# Patient Record
Sex: MALE | Race: BLACK OR AFRICAN AMERICAN | NOT HISPANIC OR LATINO | Employment: STUDENT | URBAN - METROPOLITAN AREA
[De-identification: names, ages, dates, MRNs, and addresses within clinical notes are randomized per-mention and may not be internally consistent; named-entity substitution may affect disease eponyms.]

---
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
## 2019-03-27 ENCOUNTER — OUTSIDE PLACE OF SERVICE (OUTPATIENT)
Dept: CARDIOLOGY | Facility: CLINIC | Age: 19
End: 2019-03-27
Payer: COMMERCIAL

## 2019-03-27 PROCEDURE — 93010 PR ELECTROCARDIOGRAM REPORT: ICD-10-PCS | Mod: S$GLB,,, | Performed by: STUDENT IN AN ORGANIZED HEALTH CARE EDUCATION/TRAINING PROGRAM

## 2019-03-27 PROCEDURE — 93010 ELECTROCARDIOGRAM REPORT: CPT | Mod: S$GLB,,, | Performed by: STUDENT IN AN ORGANIZED HEALTH CARE EDUCATION/TRAINING PROGRAM

## 2019-07-24 ENCOUNTER — LAB VISIT (OUTPATIENT)
Dept: LAB | Facility: HOSPITAL | Age: 19
End: 2019-07-24
Attending: FAMILY MEDICINE
Payer: COMMERCIAL

## 2019-07-24 ENCOUNTER — OFFICE VISIT (OUTPATIENT)
Dept: FAMILY MEDICINE | Facility: CLINIC | Age: 19
End: 2019-07-24
Payer: COMMERCIAL

## 2019-07-24 VITALS
HEART RATE: 57 BPM | BODY MASS INDEX: 26.45 KG/M2 | OXYGEN SATURATION: 99 % | HEIGHT: 69 IN | DIASTOLIC BLOOD PRESSURE: 70 MMHG | WEIGHT: 178.56 LBS | SYSTOLIC BLOOD PRESSURE: 138 MMHG | TEMPERATURE: 98 F

## 2019-07-24 DIAGNOSIS — Z00.00 ANNUAL PHYSICAL EXAM: ICD-10-CM

## 2019-07-24 DIAGNOSIS — Z00.00 ANNUAL PHYSICAL EXAM: Primary | ICD-10-CM

## 2019-07-24 DIAGNOSIS — F98.8 ATTENTION DEFICIT DISORDER, UNSPECIFIED HYPERACTIVITY PRESENCE: ICD-10-CM

## 2019-07-24 LAB
25(OH)D3+25(OH)D2 SERPL-MCNC: 26 NG/ML (ref 30–96)
ALBUMIN SERPL BCP-MCNC: 4 G/DL (ref 3.5–5.2)
ALP SERPL-CCNC: 61 U/L (ref 55–135)
ALT SERPL W/O P-5'-P-CCNC: 8 U/L (ref 10–44)
ANION GAP SERPL CALC-SCNC: 9 MMOL/L (ref 8–16)
AST SERPL-CCNC: 14 U/L (ref 10–40)
BASOPHILS # BLD AUTO: 0.05 K/UL (ref 0–0.2)
BASOPHILS NFR BLD: 1.3 % (ref 0–1.9)
BILIRUB SERPL-MCNC: 0.3 MG/DL (ref 0.1–1)
BUN SERPL-MCNC: 11 MG/DL (ref 6–20)
CALCIUM SERPL-MCNC: 10.4 MG/DL (ref 8.7–10.5)
CHLORIDE SERPL-SCNC: 106 MMOL/L (ref 95–110)
CO2 SERPL-SCNC: 28 MMOL/L (ref 23–29)
CREAT SERPL-MCNC: 1.2 MG/DL (ref 0.5–1.4)
DIFFERENTIAL METHOD: ABNORMAL
EOSINOPHIL # BLD AUTO: 0.1 K/UL (ref 0–0.5)
EOSINOPHIL NFR BLD: 1.8 % (ref 0–8)
ERYTHROCYTE [DISTWIDTH] IN BLOOD BY AUTOMATED COUNT: 12.3 % (ref 11.5–14.5)
EST. GFR  (AFRICAN AMERICAN): >60 ML/MIN/1.73 M^2
EST. GFR  (NON AFRICAN AMERICAN): >60 ML/MIN/1.73 M^2
ESTIMATED AVG GLUCOSE: 111 MG/DL (ref 68–131)
GLUCOSE SERPL-MCNC: 59 MG/DL (ref 70–110)
HBA1C MFR BLD HPLC: 5.5 % (ref 4–5.6)
HCT VFR BLD AUTO: 42.6 % (ref 40–54)
HGB BLD-MCNC: 14 G/DL (ref 14–18)
IMM GRANULOCYTES # BLD AUTO: 0.01 K/UL (ref 0–0.04)
IMM GRANULOCYTES NFR BLD AUTO: 0.3 % (ref 0–0.5)
LYMPHOCYTES # BLD AUTO: 1.4 K/UL (ref 1–4.8)
LYMPHOCYTES NFR BLD: 36.8 % (ref 18–48)
MCH RBC QN AUTO: 28.9 PG (ref 27–31)
MCHC RBC AUTO-ENTMCNC: 32.9 G/DL (ref 32–36)
MCV RBC AUTO: 88 FL (ref 82–98)
MONOCYTES # BLD AUTO: 0.5 K/UL (ref 0.3–1)
MONOCYTES NFR BLD: 13.6 % (ref 4–15)
NEUTROPHILS # BLD AUTO: 1.8 K/UL (ref 1.8–7.7)
NEUTROPHILS NFR BLD: 46.2 % (ref 38–73)
NRBC BLD-RTO: 0 /100 WBC
PLATELET # BLD AUTO: 322 K/UL (ref 150–350)
PMV BLD AUTO: 9.3 FL (ref 9.2–12.9)
POTASSIUM SERPL-SCNC: 4.4 MMOL/L (ref 3.5–5.1)
PROT SERPL-MCNC: 7.2 G/DL (ref 6–8.4)
RBC # BLD AUTO: 4.84 M/UL (ref 4.6–6.2)
SODIUM SERPL-SCNC: 143 MMOL/L (ref 136–145)
TSH SERPL DL<=0.005 MIU/L-ACNC: 0.69 UIU/ML (ref 0.4–4)
VIT B12 SERPL-MCNC: 361 PG/ML (ref 210–950)
WBC # BLD AUTO: 3.83 K/UL (ref 3.9–12.7)

## 2019-07-24 PROCEDURE — 99999 PR PBB SHADOW E&M-EST. PATIENT-LVL III: ICD-10-PCS | Mod: PBBFAC,,, | Performed by: FAMILY MEDICINE

## 2019-07-24 PROCEDURE — 99999 PR PBB SHADOW E&M-EST. PATIENT-LVL III: CPT | Mod: PBBFAC,,, | Performed by: FAMILY MEDICINE

## 2019-07-24 PROCEDURE — 83036 HEMOGLOBIN GLYCOSYLATED A1C: CPT

## 2019-07-24 PROCEDURE — 84443 ASSAY THYROID STIM HORMONE: CPT

## 2019-07-24 PROCEDURE — 80053 COMPREHEN METABOLIC PANEL: CPT

## 2019-07-24 PROCEDURE — 36415 COLL VENOUS BLD VENIPUNCTURE: CPT | Mod: PO

## 2019-07-24 PROCEDURE — 82607 VITAMIN B-12: CPT

## 2019-07-24 PROCEDURE — 82306 VITAMIN D 25 HYDROXY: CPT

## 2019-07-24 PROCEDURE — 99395 PREV VISIT EST AGE 18-39: CPT | Mod: S$GLB,,, | Performed by: FAMILY MEDICINE

## 2019-07-24 PROCEDURE — 99395 PR PREVENTIVE VISIT,EST,18-39: ICD-10-PCS | Mod: S$GLB,,, | Performed by: FAMILY MEDICINE

## 2019-07-24 PROCEDURE — 85025 COMPLETE CBC W/AUTO DIFF WBC: CPT

## 2019-07-24 RX ORDER — DEXTROAMPHETAMINE SACCHARATE, AMPHETAMINE ASPARTATE MONOHYDRATE, DEXTROAMPHETAMINE SULFATE AND AMPHETAMINE SULFATE 7.5; 7.5; 7.5; 7.5 MG/1; MG/1; MG/1; MG/1
30 CAPSULE, EXTENDED RELEASE ORAL EVERY MORNING
Qty: 30 CAPSULE | Refills: 0 | Status: SHIPPED | OUTPATIENT
Start: 2019-07-24 | End: 2019-07-24 | Stop reason: SDUPTHER

## 2019-07-24 RX ORDER — DEXTROAMPHETAMINE SACCHARATE, AMPHETAMINE ASPARTATE MONOHYDRATE, DEXTROAMPHETAMINE SULFATE AND AMPHETAMINE SULFATE 7.5; 7.5; 7.5; 7.5 MG/1; MG/1; MG/1; MG/1
30 CAPSULE, EXTENDED RELEASE ORAL EVERY MORNING
Qty: 30 CAPSULE | Refills: 0 | Status: SHIPPED | OUTPATIENT
Start: 2019-09-22 | End: 2019-08-27

## 2019-07-24 RX ORDER — DEXTROAMPHETAMINE SACCHARATE, AMPHETAMINE ASPARTATE MONOHYDRATE, DEXTROAMPHETAMINE SULFATE AND AMPHETAMINE SULFATE 5; 5; 5; 5 MG/1; MG/1; MG/1; MG/1
20 CAPSULE, EXTENDED RELEASE ORAL EVERY MORNING
Qty: 30 CAPSULE | Refills: 0 | Status: SHIPPED | OUTPATIENT
Start: 2019-08-23 | End: 2019-08-27

## 2019-07-24 RX ORDER — DEXTROAMPHETAMINE SACCHARATE, AMPHETAMINE ASPARTATE MONOHYDRATE, DEXTROAMPHETAMINE SULFATE AND AMPHETAMINE SULFATE 3.75; 3.75; 3.75; 3.75 MG/1; MG/1; MG/1; MG/1
15 CAPSULE, EXTENDED RELEASE ORAL EVERY MORNING
Qty: 30 CAPSULE | Refills: 0 | Status: SHIPPED | OUTPATIENT
Start: 2019-07-24 | End: 2019-07-24 | Stop reason: SDUPTHER

## 2019-07-24 RX ORDER — DEXTROAMPHETAMINE SACCHARATE, AMPHETAMINE ASPARTATE MONOHYDRATE, DEXTROAMPHETAMINE SULFATE AND AMPHETAMINE SULFATE 5; 5; 5; 5 MG/1; MG/1; MG/1; MG/1
20 CAPSULE, EXTENDED RELEASE ORAL EVERY MORNING
Qty: 30 CAPSULE | Refills: 0 | Status: SHIPPED | OUTPATIENT
Start: 2019-07-24 | End: 2019-07-24 | Stop reason: SDUPTHER

## 2019-07-24 RX ORDER — DEXTROAMPHETAMINE SACCHARATE, AMPHETAMINE ASPARTATE MONOHYDRATE, DEXTROAMPHETAMINE SULFATE AND AMPHETAMINE SULFATE 3.75; 3.75; 3.75; 3.75 MG/1; MG/1; MG/1; MG/1
15 CAPSULE, EXTENDED RELEASE ORAL EVERY MORNING
Qty: 30 CAPSULE | Refills: 0 | Status: SHIPPED | OUTPATIENT
Start: 2019-07-24 | End: 2019-08-27 | Stop reason: SDUPTHER

## 2019-07-24 NOTE — PROGRESS NOTES
Assessment & Plan  Problem List Items Addressed This Visit        Psychiatric    ADD (attention deficit disorder)    Overview     Emery forms 10/12         Current Assessment & Plan     Decreased appetite with vyvanse and symptoms of depression.           Relevant Medications    dextroamphetamine-amphetamine (ADDERALL XR) 15 MG 24 hr capsule    dextroamphetamine-amphetamine (ADDERALL XR) 20 MG 24 hr capsule (Start on 8/23/2019)    dextroamphetamine-amphetamine (ADDERALL XR) 30 MG 24 hr capsule (Start on 9/22/2019)      Other Visit Diagnoses     Annual physical exam    -  Primary    Relevant Orders    CBC auto differential (Completed)    Comprehensive metabolic panel    Hemoglobin A1c    TSH    Vitamin D    Vitamin B12        We discussed sleep hygiene as well as appropriate diet.  Discussed the importance of eating whole foods rather than processed foods.    Health Maintenance reviewed    Follow-up: No follow-ups on file.    ______________________________________________________________________    Chief Complaint  Chief Complaint   Patient presents with    ADHD    discuss medication       HPI  Roland Rosario is a 19 y.o. male with multiple medical diagnoses as listed in the medical history and problem list that presents for ADHD.  Pt is known to me with last appointment 8/14/2018.    No other medication used to treat ADHD aside from a short term use of Concerta.  Patient reports that it 70 mg of Vyvanse he did have some issues with depression as well as a severely decreased appetite.  Patient does not follow a healthy as diet.  He does have unusual sleeping hours.      PAST MEDICAL HISTORY:  Past Medical History:   Diagnosis Date    ADHD (attention deficit hyperactivity disorder)        PAST SURGICAL HISTORY:  Past Surgical History:   Procedure Laterality Date    testicular torsion      rt. testicle removed    TYMPANOSTOMY TUBE PLACEMENT         SOCIAL HISTORY:  Social History     Socioeconomic History     Marital status: Single     Spouse name: Not on file    Number of children: Not on file    Years of education: Not on file    Highest education level: Not on file   Occupational History    Not on file   Social Needs    Financial resource strain: Not on file    Food insecurity:     Worry: Not on file     Inability: Not on file    Transportation needs:     Medical: Not on file     Non-medical: Not on file   Tobacco Use    Smoking status: Never Smoker    Smokeless tobacco: Never Used   Substance and Sexual Activity    Alcohol use: No    Drug use: No    Sexual activity: Never   Lifestyle    Physical activity:     Days per week: Not on file     Minutes per session: Not on file    Stress: Not on file   Relationships    Social connections:     Talks on phone: Not on file     Gets together: Not on file     Attends Yazidi service: Not on file     Active member of club or organization: Not on file     Attends meetings of clubs or organizations: Not on file     Relationship status: Not on file   Other Topics Concern    Not on file   Social History Narrative    Pt lives with mother, father, sister.        Pt attends [employer] 12th grade.           FAMILY HISTORY:  Family History   Problem Relation Age of Onset    Hypertension Mother     Hypertension Father     Heart disease Maternal Grandmother     Hyperlipidemia Maternal Grandmother     Hypertension Maternal Grandmother     Cancer Maternal Grandfather         prsotate    Asthma Neg Hx     Birth defects Neg Hx     Chromosomal disorder Neg Hx     Diabetes Neg Hx     Early death Neg Hx     Mental illness Neg Hx     Seizures Neg Hx     Thyroid disease Neg Hx     ADD / ADHD Neg Hx     Other Neg Hx         no arrhythmias or sudden death       ALLERGIES AND MEDICATIONS: updated and reviewed.  Review of patient's allergies indicates:  No Known Allergies  Current Outpatient Medications   Medication Sig Dispense Refill     "dextroamphetamine-amphetamine (ADDERALL XR) 15 MG 24 hr capsule Take 1 capsule (15 mg total) by mouth every morning. 30 capsule 0    [START ON 8/23/2019] dextroamphetamine-amphetamine (ADDERALL XR) 20 MG 24 hr capsule Take 1 capsule (20 mg total) by mouth every morning. 30 capsule 0    [START ON 9/22/2019] dextroamphetamine-amphetamine (ADDERALL XR) 30 MG 24 hr capsule Take 1 capsule (30 mg total) by mouth every morning. 30 capsule 0     No current facility-administered medications for this visit.          ROS  Review of Systems   Constitutional: Negative for activity change, appetite change, fatigue, fever and unexpected weight change.   HENT: Negative for congestion and facial swelling.    Eyes: Negative for visual disturbance.   Respiratory: Negative for chest tightness, shortness of breath, wheezing and stridor.    Cardiovascular: Negative for chest pain, palpitations and leg swelling.   Gastrointestinal: Negative for abdominal distention, abdominal pain, constipation, diarrhea, nausea and vomiting.   Endocrine: Negative for cold intolerance, heat intolerance, polydipsia and polyuria.   Skin: Negative.    Allergic/Immunologic: Negative.    Neurological: Negative for dizziness, light-headedness, numbness and headaches.   Psychiatric/Behavioral: Negative for agitation and decreased concentration.           Physical Exam  Vitals:    07/24/19 1121   BP: 138/70   BP Location: Left arm   Patient Position: Sitting   BP Method: Large (Manual)   Pulse: (!) 57   Temp: 97.8 °F (36.6 °C)   TempSrc: Oral   SpO2: 99%   Weight: 81 kg (178 lb 9.2 oz)   Height: 5' 9" (1.753 m)    Body mass index is 26.37 kg/m².  Weight: 81 kg (178 lb 9.2 oz)   Height: 5' 9" (175.3 cm)   Physical Exam   Constitutional: He is oriented to person, place, and time. He appears well-developed and well-nourished.   HENT:   Head: Normocephalic and atraumatic.   Right Ear: External ear normal.   Left Ear: External ear normal.   Nose: Nose normal. "   Mouth/Throat: Oropharynx is clear and moist. No oropharyngeal exudate.   Eyes: Pupils are equal, round, and reactive to light. Conjunctivae and EOM are normal.   Neck: Normal range of motion. Neck supple.   Cardiovascular: Normal rate, regular rhythm, normal heart sounds and intact distal pulses. Exam reveals no gallop and no friction rub.   No murmur heard.  Pulmonary/Chest: Effort normal and breath sounds normal.   Abdominal: Soft. Bowel sounds are normal.   Musculoskeletal: Normal range of motion.   Neurological: He is alert and oriented to person, place, and time. He has normal reflexes.   Skin: Skin is warm and dry.   Psychiatric: He has a normal mood and affect. His behavior is normal. Thought content normal.   Vitals reviewed.        Health Maintenance       Date Due Completion Date    HPV Vaccines (3 - Male 3-dose series) 10/23/2017 7/31/2017    Influenza Vaccine 08/01/2019 11/7/2015    TETANUS VACCINE 06/09/2020 6/9/2010              Patient note was created using [employer].  Any errors in syntax or even information may not have been identified and edited on initial review prior to signing this note.

## 2019-07-29 ENCOUNTER — OFFICE VISIT (OUTPATIENT)
Dept: PSYCHIATRY | Facility: CLINIC | Age: 19
End: 2019-07-29
Payer: COMMERCIAL

## 2019-07-29 DIAGNOSIS — F98.8 ADD (ATTENTION DEFICIT DISORDER) WITHOUT HYPERACTIVITY: Primary | ICD-10-CM

## 2019-07-29 PROCEDURE — 99999 PR PBB SHADOW E&M-EST. PATIENT-LVL II: CPT | Mod: PBBFAC,,, | Performed by: SOCIAL WORKER

## 2019-07-29 PROCEDURE — 99999 PR PBB SHADOW E&M-EST. PATIENT-LVL II: ICD-10-PCS | Mod: PBBFAC,,, | Performed by: SOCIAL WORKER

## 2019-07-29 PROCEDURE — 90791 PR PSYCHIATRIC DIAGNOSTIC EVALUATION: ICD-10-PCS | Mod: S$GLB,,, | Performed by: SOCIAL WORKER

## 2019-07-29 PROCEDURE — 90791 PSYCH DIAGNOSTIC EVALUATION: CPT | Mod: S$GLB,,, | Performed by: SOCIAL WORKER

## 2019-07-29 NOTE — PROGRESS NOTES
Psychiatry Initial Visit (PhD/LCSW)  Diagnostic Interview - CPT 91135    Date: 7/29/2019    Site: [facility]    Referral source: family    Clinical status of patient: Outpatient    Roland Rosario, a 19 y.o. male, for initial evaluation visit.  Met with patient and mother.    Chief complaint/reason for encounter: attention deficit, sleep and behavior    History of present illness: here with mother for symptoms of ADD more than ADHD and when younger was ADHD, and was on vyvance for many years from Dr. Marla Sanchez and found when he was in college at [employer] right after high school he has side effects from the vyvance and stopped using it, after one year at [employer], did not do well at [employer] and did not do well the first semester, and was not doing well after that so left and went to his home [employer] college, he did not do well either, and wants now to study computers and IT for companies and plants. Laurie to the [employer] one semester. In 2018 fall. He worked at [employer] for several months and was let fo due to hard time focsing, he thinks he still has the ADD part of ADHD but not hyper.    Pain: 0    Symptoms:   · Mood: denied  · Anxiety: denied  · Substance abuse: denied  · Cognitive functioning: denied  · Health behaviors: noncontributory    Psychiatric history: none    Medical history: history of ADHD and medications, for ADHD mostly vyvance and had to change doctors because he outgrew his pediatrician had to see Dr. Mariel Jaquez MD recent for working with PCP adults. On the [employer]. Here for help with ADHD and or ADD and why he can not be successful, does not like school.    Family history of psychiatric illness: none    Social history (marriage, employment, etc.): he feels more prepared ad more mature for school and adult life now so will start community college again, lives with mother who is a teacher age 55, and father age 54 works in a plant and sister lives out of the home and age 26 she  has a child and works on her masters degree. Family are responsible and healthy and want help for him.    Substance use:   Alcohol: none   Drugs: none   Tobacco: none   Caffeine: some    Current medications and drug reactions (include OTC, herbal): see medication list none right now and the MD on the [facility] [place] a prescription for adderall of 15mg and then 20 mg. Than have not filled yet. Has been on concerta and vyvance in the past.    Strengths and liabilities: Strength: Patient accepts guidance/feedback, Strength: Patient is expressive/articulate., Strength: Patient is intelligent., Strength: Patient is motivated for change., Strength: Patient is physically healthy., Strength: Patient has positive support network., Strength: Patient has reasonable judgment., Strength: Patient is stable., Liability: Patient is defensive., Liability: Patient lacks coping skills.    Current Evaluation:     Mental Status Exam:  General Appearance:  unremarkable, age appropriate   Speech: normal tone, normal rate, normal pitch, normal volume      Level of Cooperation: cooperative      Thought Processes: normal and logical   Mood: steady, ADD information      Thought Content: normal, no suicidality, no homicidality, delusions, or paranoia   Affect: congruent and appropriate   Orientation: Oriented x3   Memory: recent >  intact, remote >  intact   Attention Span & Concentration: some issues are occurring   Fund of General Knowledge: intact and appropriate to age and level of education   Abstract Reasoning: interpretation of similarities was concrete   Judgment & Insight: good     Language  intact     Diagnostic Impression - Plan:       ICD-10-CM ICD-9-CM   1. ADD (attention deficit disorder) without hyperactivity F98.8 314.00       Plan:individual psychotherapy, consult psychiatrist for medication evaluation, medication management by physician and basic ADHD assessment    Return to Clinic: 2 weeks    Length of Service (minutes): 45    Will see him for counseling, and also see MD here  For psychiatric evaluation, and also have him re-evaluated for ADD or other issues. Read chidi, math is an issues and does not in the past like school, says he is more mature and responsible now and will do better.

## 2019-08-01 ENCOUNTER — OFFICE VISIT (OUTPATIENT)
Dept: PSYCHIATRY | Facility: CLINIC | Age: 19
End: 2019-08-01
Payer: COMMERCIAL

## 2019-08-01 VITALS
HEART RATE: 82 BPM | WEIGHT: 182.19 LBS | DIASTOLIC BLOOD PRESSURE: 79 MMHG | SYSTOLIC BLOOD PRESSURE: 138 MMHG | BODY MASS INDEX: 26.91 KG/M2

## 2019-08-01 DIAGNOSIS — F90.0 ADHD, PREDOMINANTLY INATTENTIVE TYPE: Primary | ICD-10-CM

## 2019-08-01 LAB
AMP D-AMPHETAMINE 1000 NG/ML: NEGATIVE
BAR SECOBARBITAL 300 NG/ML: NEGATIVE
BUP BUPRENORPHINE 10 NG/ML: NEGATIVE
BZO OXAZEPAM 300 NG/ML: NEGATIVE
COC BENZOYLECGONINE 300 NG/ML: NEGATIVE
MAMP D-METHAMPHETAMINE 1000 NG/ML: NEGATIVE
MOP MORPHINE 300 NG/ML: NEGATIVE
MTD METHADONE 300 NG/ML: NEGATIVE
QXY OXYCODONE 100 NG/ML: NEGATIVE
THC 11-NOR-9-TETRAHYDROCANNABINOL-9-CARBOXYLIC ACID: NEGATIVE

## 2019-08-01 PROCEDURE — 99999 PR PBB SHADOW E&M-EST. PATIENT-LVL II: ICD-10-PCS | Mod: PBBFAC,,, | Performed by: STUDENT IN AN ORGANIZED HEALTH CARE EDUCATION/TRAINING PROGRAM

## 2019-08-01 PROCEDURE — 99212 OFFICE O/P EST SF 10 MIN: CPT | Mod: S$GLB,,, | Performed by: STUDENT IN AN ORGANIZED HEALTH CARE EDUCATION/TRAINING PROGRAM

## 2019-08-01 PROCEDURE — 99212 PR OFFICE/OUTPT VISIT, EST, LEVL II, 10-19 MIN: ICD-10-PCS | Mod: S$GLB,,, | Performed by: STUDENT IN AN ORGANIZED HEALTH CARE EDUCATION/TRAINING PROGRAM

## 2019-08-01 PROCEDURE — 99999 PR PBB SHADOW E&M-EST. PATIENT-LVL II: CPT | Mod: PBBFAC,,, | Performed by: STUDENT IN AN ORGANIZED HEALTH CARE EDUCATION/TRAINING PROGRAM

## 2019-08-01 PROCEDURE — 80305 DRUG TEST PRSMV DIR OPT OBS: CPT | Mod: QW,S$GLB,, | Performed by: STUDENT IN AN ORGANIZED HEALTH CARE EDUCATION/TRAINING PROGRAM

## 2019-08-01 PROCEDURE — 80305 POCT URINE DRUG SCREEN PAIN MANAGEMENT: ICD-10-PCS | Mod: QW,S$GLB,, | Performed by: STUDENT IN AN ORGANIZED HEALTH CARE EDUCATION/TRAINING PROGRAM

## 2019-08-01 NOTE — PATIENT INSTRUCTIONS
Amphetamine; Dextroamphetamine extended-release capsules  What is this medicine?  AMPHETAMINE; DEXTROAMPHETAMINE (am FET a meen; dex troe am FET a meen) is used to treat attention-deficit hyperactivity disorder (ADHD). Federal law prohibits giving this medicine to any person other than the person for whom it was prescribed. Do not share this medicine with anyone else.  How should I use this medicine?  Take this medicine by mouth with a glass of water. Follow the directions on the prescription label. This medicine is taken just one time per day, usually in the morning after waking up. Take with or without food. Do not chew or crush this medicine. You may open the capsules and sprinkle the medicine on a spoonful of applesauce. If sprinkled on applesauce, take the dose immediately and do not crush or chew. Always drink a glass of water or other liquid after taking this medicine. Do not take your medicine more often than directed.  A special MedGuide will be given to you by the pharmacist with each prescription and refill. Be sure to read this information carefully each time.  Talk to your pediatrician regarding the use of this medicine in children. While this drug may be prescribed for children as young as 6 years for selected conditions, precautions do apply.  What side effects may I notice from receiving this medicine?  Side effects that you should report to your doctor or health care professional as soon as possible:  · allergic reactions like skin rash, itching or hives, swelling of the face, lips, or tongue  · changes in vision  · chest pain or chest tightness  · confusion, trouble speaking or understanding  · fast, irregular heartbeat  · fingers or toes feel numb, cool, painful  · hallucination, loss of contact with reality  · high blood pressure  · males: prolonged or painful erection  · seizures  · severe headaches  · shortness of breath  · suicidal thoughts or other mood changes  · trouble walking,  dizziness, loss of balance or coordination  · uncontrollable head, mouth, neck, arm, or leg movements  Side effects that usually do not require medical attention (report to your doctor or health care professional if they continue or are bothersome):  · anxious  · headache  · loss of appetite  · nausea, vomiting  · trouble sleeping  · weight loss  What may interact with this medicine?  Do not take this medicine with any of the following medications:  · MAOIs like Carbex, Eldepryl, Marplan, Nardil, and Parnate  · other stimulant medicines for attention disorders, weight loss, or to stay awake  This medicine may also interact with the following medications:  · acetazolamide  · ammonium chloride  · antacids  · ascorbic acid  · atomoxetine  · caffeine  · certain medicines for blood pressure  · certain medicines for depression, anxiety, or psychotic disturbances  · certain medicines for diabetes  · certain medicines for seizures like carbamazepine, phenobarbital, phenytoin  · certain medicines for stomach problems like cimetidine, famotidine, omeprazole, lansoprazole  · cold or allergy medicines  · glutamic acid  · lithium  · meperidine  · methenamine; sodium acid phosphate  · narcotic medicines for pain  · norepinephrine  · phenothiazines like chlorpromazine, mesoridazine, prochlorperazine, thioridazine  · sodium bicarbonate  What if I miss a dose?  If you miss a dose, take it as soon as you can. If it is almost time for your next dose, take only that dose. Do not take double or extra doses.  Where should I keep my medicine?  Keep out of the reach of children. This medicine can be abused. Keep your medicine in a safe place to protect it from theft. Do not share this medicine with anyone. Selling or giving away this medicine is dangerous and against the law.  Store at room temperature between 15 and 30 degrees C (59 and 86 degrees F). Keep container tightly closed. Protect from light. Throw away any unused medicine after  the expiration date.  What should I tell my health care provider before I take this medicine?  They need to know if you have any of these conditions:  · anxiety or panic attacks  · circulation problems in fingers and toes  · glaucoma  · hardening or blockages of the arteries or heart blood vessels  · heart disease or a heart defect  · high blood pressure  · history of a drug or alcohol abuse problem  · history of stroke  · kidney disease  · liver disease  · mental illness  · seizures  · suicidal thoughts, plans, or attempt; a previous suicide attempt by you or a family member  · thyroid disease  · Tourette's syndrome  · an unusual or allergic reaction to dextroamphetamine, other amphetamines, other medicines, foods, dyes, or preservatives  · pregnant or trying to get pregnant  · breast-feeding  What should I watch for while using this medicine?  Visit your doctor or health care professional for regular checks on your progress. This prescription requires that you follow special procedures with your doctor and pharmacy. You will need to have a new written prescription from your doctor every time you need a refill.  This medicine may affect your concentration, or hide signs of tiredness. Until you know how this medicine affects you, do not drive, ride a bicycle, use machinery, or do anything that needs mental alertness.  Tell your doctor or health care professional if this medicine loses its effects, or if you feel you need to take more than the prescribed amount. Do not change the dosage without talking to your doctor or health care professional.  Decreased appetite is a common side effect when starting this medicine. Eating small, frequent meals or snacks can help. Talk to your doctor if you continue to have poor eating habits. Height and weight growth of a child taking this medicine will be monitored closely.  Do not take this medicine close to bedtime. It may prevent you from sleeping.  If you are going to need  surgery, an MRI, a CT scan, or other procedure, tell your doctor that you are taking this medicine. You may need to stop taking this medicine before the procedure.  Tell your doctor or healthcare professional right away if you notice unexplained wounds on your fingers and toes while taking this medicine. You should also tell your healthcare provider if you experience numbness or pain, changes in the skin color, or sensitivity to temperature in your fingers or toes.  NOTE:This sheet is a summary. It may not cover all possible information. If you have questions about this medicine, talk to your doctor, pharmacist, or health care provider. Copyright© 2017 [employer]

## 2019-08-01 NOTE — PROGRESS NOTES
"2019   Roland Rosario  : 2000  MRN: [id number]    Psychiatry Clinic Initial Evaluation  ?  Assessment:    Roland Rosario is a 19 y.o. male with a past history significant for ADHD who presented to clinic on referral from Psychology for assessment of ADHD symptoms and medication management     Plan:    -Patient with Adderall XR 15mg daily perscription from primary care  -Patient's mother asked multiple questions regarding supplements and told omega acids are okay  -Will continue with 15mg dose for 2 weeks to assess efficacy; can self titrate at home to 30mg daily if not noting improvement in attention  -Patient enrolling in [employer] on the  will return to clinic following to assess functioning in class  -Discussed drug screening and patient is amenable     RTC in 4 weeks    The potential risks, benefits, alternatives, and inherent unpredictabilities of management were discussed with the patient.  Policies of confidentiality, late policy/therapeutic hour, refill policy, clinic contact, and ED presentation criteria were discussed with the patient.  All voiced questions were answered.    Case discussed with staff psychiatrist, Tasia Estrada MD      Subjective:    HPI:    Roland Rosario is a 19 y.o. male with a history of ADHD who presented to clinic on 2019 on referral from psychology for management of ADHD symptoms.     -Patient previously on Concerta in 8th grade per collateral from mother with good response  -Reports the patient has failed three semesters while at [employer] and also lost track scholarship due to poor performance  -Pt felt discouraged in english and math after failing   -Vyvanse 11th grade through college with adverse effect of decreased appetite and diminished mood, titrated to 70mg     When initially diagnosed pt was noted by teachers and mother: "Couldn't sit still, out of seat multiple times, always interrupting, difficulty focusing"    Presently the patient endorses " distractability, difficulty organizing tasks, poor time management, forgetfulness.     Starting community college on the 21st     Denies illicit drug use, depressed mood, excessive worry, decrease need for sleep, icreased goal directed behavior, elevated mood, paranoia, AH, VH, SI, HI    Review of systems:  Constitutional: denies fatigue, weight change  Eyes: denies changes in vision, pain in eyes  Ears: denies changes in hearing, pain in ears  Mouth/throat: denies changes in voice, difficulty swallowing  Respiratory: denies shortness of breath, cough  Gastrointestinal: denies abdominal pain, nausea, constipation, diarrhea  Genitourinary: denies dysuria, changes in volume/frequency  Dermatologic: denies rash  Musculoskeletal: denies myalgias, arthralgias  Hematologic: denies easy bleeding/bruising  Neurologic: denies seizures, headaches  Psychiatric: see HPI      History:    Medical/Surgical History:  Past Medical History:   Diagnosis Date    ADHD (attention deficit hyperactivity disorder)     Hx of psychiatric care     Psychiatric problem        Past Surgical History:   Procedure Laterality Date    testicular torsion      rt. testicle removed    TYMPANOSTOMY TUBE PLACEMENT           Past Psychiatric History:  Previous Diagnoses: ADHD  Previous Medication Trials: yes, Concerta- good effect per collateral  Vyvanse- negative effect of poor appetite and diminished mood  Previous Psychiatric Hospitalizations:no  Previous Suicide Attempts: no  History of Violence: no  Outpatient psychiatrist: no    Social History:  Born and raised: [facility]   Marital Status: not   Children: 0  Other significant relationships: New uncle to 6 month old niece   Employment Status/Info: Unemployed, student   Education: Some college  Special Ed: no  Legal stressors/past charges: none  Hobbies: Video games  Housing Status: with family  History of phys/sexual abuse: denies  Easy access to gun: no    Substance Abuse  "History:  Tobacco Use:yes, vaping  Use of Alcohol: occasional, social use  Recreational Drugs: Denied   Rehab History:no    Family Psychiatric History:  Maternal father ?alcohol use disorder     Objective:    Current Medications:  Current Outpatient Medications on File Prior to Visit   Medication Sig Dispense Refill    dextroamphetamine-amphetamine (ADDERALL XR) 15 MG 24 hr capsule Take 1 capsule (15 mg total) by mouth every morning. 30 capsule 0    [START ON 8/23/2019] dextroamphetamine-amphetamine (ADDERALL XR) 20 MG 24 hr capsule Take 1 capsule (20 mg total) by mouth every morning. 30 capsule 0    [START ON 9/22/2019] dextroamphetamine-amphetamine (ADDERALL XR) 30 MG 24 hr capsule Take 1 capsule (30 mg total) by mouth every morning. 30 capsule 0     No current facility-administered medications on file prior to visit.        Allergies:  Patient has no known allergies.    Vital Signs:  Vitals:    08/01/19 0753   BP: 138/79   Pulse: 82     Body mass index is 26.91 kg/m².    General Physical:  Motor: normal bulk and tone, grossly intact  Gait/Station: normal gait no deficits in station     Mental Status Exam:  Appearance/Behavior:  Normocephalic, atraumatic, appears stated age, fair grooming, engaged, appropriate eye contact, no abnormal involuntary movements   Speech: conversational rate, tone, volume, articulation  Language: english, fluent, without gross idiosyncrasies; at times profane   Mood and affect: "good," full affect  mood congruent, appropriate to situation  Thought Process: linear, organized, goal directed   Associations: intact  Thought Content/Perceptual disturbances: no reported suicidal or homicidal ideation and intent/ no reported auditory/visual hallucinations, no noted responding to internal stimuli  Sensorium/Orientation: AAOx3  Attention/Concentration: intact to interview  Recent/Remote Memory: intact to childhood events, intact to recent medical appointments   Fund of Knowledge: consistent " with education  Insight: Fair  Judgment: fair   ?    Laboratory Data:  Labs reviewed, including but not limited to:   Recent Labs   Lab 07/24/19  1206   WBC 3.83 L   Hemoglobin 14.0   Hematocrit 42.6   Mean Corpuscular Volume 88   Platelets 322   Sodium 143   Potassium 4.4   Chloride 106   Creatinine 1.2   BUN, Bld 11   AST 14   ALT 8 L   Albumin 4.0   TSH 0.686     Hemoglobin A1C   Date Value Ref Range Status   07/24/2019 5.5 4.0 - 5.6 % Final     Comment:     ADA Screening Guidelines:  5.7-6.4%  Consistent with prediabetes  >or=6.5%  Consistent with diabetes  High levels of fetal hemoglobin interfere with the HbA1C  assay. Heterozygous hemoglobin variants (HbS, HgC, etc)do  not significantly interfere with this assay.   However, presence of multiple variants may affect accuracy.             Imaging:  Pertinent imaging reviewed, including but not limited to:      Medicine section tests:  Vitamin D    ?  Tasia Estrada MD  PGY 3 [employer] [facility]  8/1/2019 9:36 AM

## 2019-08-02 NOTE — PROGRESS NOTES
STAFF COMMENTS:  I have seen and evaluated the patient, and reviewed the history and exam.  I agree and concur with the assessment and plan.  Pt quite inattentive during exam.  Agree with urine toxicology screen and treatment of ADHD.

## 2019-08-12 ENCOUNTER — OFFICE VISIT (OUTPATIENT)
Dept: PSYCHIATRY | Facility: CLINIC | Age: 19
End: 2019-08-12
Payer: COMMERCIAL

## 2019-08-12 DIAGNOSIS — F98.8 ADD (ATTENTION DEFICIT DISORDER) WITHOUT HYPERACTIVITY: Primary | ICD-10-CM

## 2019-08-12 PROCEDURE — 90832 PR PSYCHOTHERAPY W/PATIENT, 30 MIN: ICD-10-PCS | Mod: S$GLB,,, | Performed by: SOCIAL WORKER

## 2019-08-12 PROCEDURE — 99999 PR PBB SHADOW E&M-EST. PATIENT-LVL I: CPT | Mod: PBBFAC,,, | Performed by: SOCIAL WORKER

## 2019-08-12 PROCEDURE — 99999 PR PBB SHADOW E&M-EST. PATIENT-LVL I: ICD-10-PCS | Mod: PBBFAC,,, | Performed by: SOCIAL WORKER

## 2019-08-12 PROCEDURE — 90832 PSYTX W PT 30 MINUTES: CPT | Mod: S$GLB,,, | Performed by: SOCIAL WORKER

## 2019-08-13 NOTE — PROGRESS NOTES
Individual Psychotherapy (PhD/LCSW)    8/12/2019    Site:  [facility]         Therapeutic Intervention: Met with patient.  Outpatient - Insight oriented psychotherapy 30 min - CPT code 15500    Chief complaint/reason for encounter: attention deficit and anxiety     Interval history and content of current session: saw him alone and then talked with he and mother a minute, two issues, sleep, and meds and school starting, see after school starts again, and keep his progress going, stable, less anxious, less depressed, no drugs, and mood good, and sleep issues, and how to deal with these and call MD on this. Taking care of himself, connecting with others, job, school and career all focused on and good progress noted.    Treatment plan:  · Target symptoms: depression, distractability, lack of focus, anxiety , adjustment, work stress  · Why chosen therapy is appropriate versus another modality: relevant to diagnosis, patient responds to this modality, evidence based practice  · Outcome monitoring methods: self-report, observation  · Therapeutic intervention type: insight oriented psychotherapy, behavior modifying psychotherapy, supportive psychotherapy    Risk parameters:  Patient reports no suicidal ideation  Patient reports no homicidal ideation  Patient reports no self-injurious behavior  Patient reports no violent behavior    Verbal deficits: None    Patient's response to intervention:  The patient's response to intervention is accepting, motivated.    Progress toward goals and other mental status changes:  The patient's progress toward goals is fair .    Diagnosis:     ICD-10-CM ICD-9-CM   1. ADD (attention deficit disorder) without hyperactivity F98.8 314.00       Plan:  individual psychotherapy, family psychotherapy and consult psychiatrist for medication evaluation    Return to clinic: 2 weeks    Length of Service (minutes): 30

## 2019-08-26 DIAGNOSIS — F98.8 ATTENTION DEFICIT DISORDER, UNSPECIFIED HYPERACTIVITY PRESENCE: ICD-10-CM

## 2019-08-26 NOTE — TELEPHONE ENCOUNTER
----- Message from Candi Mantilla sent at 8/26/2019 11:20 AM CDT -----  Contact: Mother-Jaclyn  Type: RX Refill Request    Who Called:  Jaclyn- Mother    Refill or New Rx: refill    RX Name and Strength: dextroamphetamine-amphetamine (ADDERALL XR) 30 MG 24 hr capsule    Is this a 30 day or 90 day RX 90 day supply less expensive with insurance  Preferred Pharmacy with phone number: [facility]/pharmacy #[id number] [address] AT [facility] [phone] (Phone)  [phone] (Fax)        Local or Mail Order: local    Ordering Provider: Dr. Jaquez    Would the patient rather a call back or a response via [facility]? call    Best Call Back Number: [phone]

## 2019-08-26 NOTE — TELEPHONE ENCOUNTER
Called patient and no answer a message was left for patient to call us back at the clinic regarding medication refill.  See previous message.

## 2019-08-26 NOTE — TELEPHONE ENCOUNTER
Spoke with patients mother and she stated that she did not get the other ones filled because they were the 30 mg and that you told her if  the 15 mg was fine for him that he could take that one instead of the 30 mg, she stated that she wanted a prescription of the 15 mg for 90 days because it was cheaper to do the 90 instead of the 30. Please advise.

## 2019-08-27 RX ORDER — DEXTROAMPHETAMINE SACCHARATE, AMPHETAMINE ASPARTATE MONOHYDRATE, DEXTROAMPHETAMINE SULFATE AND AMPHETAMINE SULFATE 3.75; 3.75; 3.75; 3.75 MG/1; MG/1; MG/1; MG/1
15 CAPSULE, EXTENDED RELEASE ORAL EVERY MORNING
Qty: 30 CAPSULE | Refills: 0 | Status: SHIPPED | OUTPATIENT
Start: 2019-08-27 | End: 2019-08-30

## 2019-08-27 RX ORDER — DEXTROAMPHETAMINE SACCHARATE, AMPHETAMINE ASPARTATE MONOHYDRATE, DEXTROAMPHETAMINE SULFATE AND AMPHETAMINE SULFATE 3.75; 3.75; 3.75; 3.75 MG/1; MG/1; MG/1; MG/1
15 CAPSULE, EXTENDED RELEASE ORAL EVERY MORNING
Qty: 30 CAPSULE | Refills: 0 | Status: SHIPPED | OUTPATIENT
Start: 2019-09-27 | End: 2019-08-30

## 2019-08-27 RX ORDER — DEXTROAMPHETAMINE SACCHARATE, AMPHETAMINE ASPARTATE MONOHYDRATE, DEXTROAMPHETAMINE SULFATE AND AMPHETAMINE SULFATE 7.5; 7.5; 7.5; 7.5 MG/1; MG/1; MG/1; MG/1
30 CAPSULE, EXTENDED RELEASE ORAL EVERY MORNING
Qty: 30 CAPSULE | Refills: 0 | Status: CANCELLED | OUTPATIENT
Start: 2019-09-22 | End: 2019-10-22

## 2019-08-27 NOTE — TELEPHONE ENCOUNTER
Spoke with patients mother , informed her per , patient needs to be scheduled for October appt. That a 90 day supply cannot be given at this time but maybe at the next appt.  stated she will call back to schedule once she checks patients school calendar. Also informed script ready for .

## 2019-08-27 NOTE — TELEPHONE ENCOUNTER
I cannot provide a 90 day supply for this prescription.  He has to be seen October for his next prescription. At this appointment I may be able to do a 90 day supply.  He has to be seen every 3 months for this medication. Please schedule his appointment for October.

## 2019-08-30 ENCOUNTER — OFFICE VISIT (OUTPATIENT)
Dept: PSYCHIATRY | Facility: CLINIC | Age: 19
End: 2019-08-30
Payer: COMMERCIAL

## 2019-08-30 VITALS
SYSTOLIC BLOOD PRESSURE: 143 MMHG | DIASTOLIC BLOOD PRESSURE: 76 MMHG | HEART RATE: 60 BPM | BODY MASS INDEX: 27.38 KG/M2 | WEIGHT: 185.44 LBS

## 2019-08-30 DIAGNOSIS — F98.8 ADD (ATTENTION DEFICIT DISORDER) WITHOUT HYPERACTIVITY: Primary | ICD-10-CM

## 2019-08-30 DIAGNOSIS — F98.8 ATTENTION DEFICIT DISORDER, UNSPECIFIED HYPERACTIVITY PRESENCE: ICD-10-CM

## 2019-08-30 PROCEDURE — 99213 OFFICE O/P EST LOW 20 MIN: CPT | Mod: S$GLB,,, | Performed by: STUDENT IN AN ORGANIZED HEALTH CARE EDUCATION/TRAINING PROGRAM

## 2019-08-30 PROCEDURE — 99999 PR PBB SHADOW E&M-EST. PATIENT-LVL II: ICD-10-PCS | Mod: PBBFAC,,, | Performed by: STUDENT IN AN ORGANIZED HEALTH CARE EDUCATION/TRAINING PROGRAM

## 2019-08-30 PROCEDURE — 99213 PR OFFICE/OUTPT VISIT, EST, LEVL III, 20-29 MIN: ICD-10-PCS | Mod: S$GLB,,, | Performed by: STUDENT IN AN ORGANIZED HEALTH CARE EDUCATION/TRAINING PROGRAM

## 2019-08-30 PROCEDURE — 99999 PR PBB SHADOW E&M-EST. PATIENT-LVL II: CPT | Mod: PBBFAC,,, | Performed by: STUDENT IN AN ORGANIZED HEALTH CARE EDUCATION/TRAINING PROGRAM

## 2019-08-30 RX ORDER — DEXTROAMPHETAMINE SACCHARATE, AMPHETAMINE ASPARTATE MONOHYDRATE, DEXTROAMPHETAMINE SULFATE AND AMPHETAMINE SULFATE 3.75; 3.75; 3.75; 3.75 MG/1; MG/1; MG/1; MG/1
15 CAPSULE, EXTENDED RELEASE ORAL EVERY MORNING
Qty: 30 CAPSULE | Refills: 0 | Status: SHIPPED | OUTPATIENT
Start: 2019-09-30 | End: 2019-08-30

## 2019-08-30 RX ORDER — DEXTROAMPHETAMINE SACCHARATE, AMPHETAMINE ASPARTATE MONOHYDRATE, DEXTROAMPHETAMINE SULFATE AND AMPHETAMINE SULFATE 3.75; 3.75; 3.75; 3.75 MG/1; MG/1; MG/1; MG/1
15 CAPSULE, EXTENDED RELEASE ORAL EVERY MORNING
Qty: 30 CAPSULE | Refills: 0 | Status: SHIPPED | OUTPATIENT
Start: 2019-08-30 | End: 2019-08-30

## 2019-08-30 RX ORDER — DEXTROAMPHETAMINE SACCHARATE, AMPHETAMINE ASPARTATE MONOHYDRATE, DEXTROAMPHETAMINE SULFATE AND AMPHETAMINE SULFATE 3.75; 3.75; 3.75; 3.75 MG/1; MG/1; MG/1; MG/1
15 CAPSULE, EXTENDED RELEASE ORAL EVERY MORNING
Qty: 30 CAPSULE | Refills: 0 | Status: SHIPPED | OUTPATIENT
Start: 2019-10-30

## 2019-08-30 NOTE — TELEPHONE ENCOUNTER
Patient's mother called to ask whether his Adderall Rx.'s are ready for .  I informed her that 2 Rx.'s are at the  for pick and advised her to set up his F/U appointment when she comes in, to assure future refills.  She verbalized understanding.

## 2019-08-30 NOTE — PROGRESS NOTES
2019   Roland Rosario  : 2000  MRN: [id number]    Psychiatry Clinic Initial Evaluation  ?  Assessment:    Roland Rosario is a 19 y.o. male with a past history significant for ADHD who presented to clinic on referral from Psychology for assessment of ADHD symptoms and medication management     Plan:    - Continue Adderall XR 15mg printed three prescriptions (Aug, Sept, Oct)  -Patient enrolled in [employer] courses and feels they are going well  -UDS negative on 19    RTC in 8-12 weeks    The potential risks, benefits, alternatives, and inherent unpredictabilities of management were discussed with the patient.  Policies of confidentiality, late policy/therapeutic hour, refill policy, clinic contact, and ED presentation criteria were discussed with the patient.  All voiced questions were answered.    Case to be discussed with Staff Psychiatrist, Dr. Platt       Subjective:    HPI:    Roland Rosario is a 19 y.o. male with a history of ADHD who presented to clinic on 2019 on referral from psychology for management of ADHD symptoms.     -Patient reports that he started his [employer] program. States that he has class Tuesday and Thursday at 8am and at times those can be difficult to get to.  -Denies issues with attention in courses, denies issues with time management and forgetfulness  -UDS negative on last visit  Does report insomnia but states he feels this has been present prior to the medications. Will log sleep and bring to next appointment.   Feels appetite is unchanged   Denies palpitations, lightheadedness, dizziness, irritability, depressed mood, excessive worry, SI,HI,AVH    Initial Hx:  -Patient previously on Concerta in 8th grade per collateral from mother with good response  -Reports the patient has failed three semesters while at [employer] and also lost track scholarship due to poor performance  -Pt felt discouraged in english and math after failing   -Vyvanse 11th grade  "through college with adverse effect of decreased appetite and diminished mood, titrated to 70mg     When initially diagnosed pt was noted by teachers and mother: "Couldn't sit still, out of seat multiple times, always interrupting, difficulty focusing"    Presently the patient endorses distractability, difficulty organizing tasks, poor time management, forgetfulness.     Starting community college on the 21st     Denies illicit drug use, depressed mood, excessive worry, decrease need for sleep, icreased goal directed behavior, elevated mood, paranoia, AH, VH, SI, HI    Review of systems:  Constitutional: denies fatigue, weight change  Eyes: denies changes in vision, pain in eyes  Ears: denies changes in hearing, pain in ears  Mouth/throat: denies changes in voice, difficulty swallowing  Respiratory: denies shortness of breath, cough  Gastrointestinal: denies abdominal pain, nausea, constipation, diarrhea  Genitourinary: denies dysuria, changes in volume/frequency  Dermatologic: denies rash  Musculoskeletal: denies myalgias, arthralgias  Hematologic: denies easy bleeding/bruising  Neurologic: denies seizures, headaches  Psychiatric: see HPI      History:    Medical/Surgical History:  Past Medical History:   Diagnosis Date    ADHD (attention deficit hyperactivity disorder)     Hx of psychiatric care     Psychiatric problem        Past Surgical History:   Procedure Laterality Date    testicular torsion      rt. testicle removed    TYMPANOSTOMY TUBE PLACEMENT           Past Psychiatric History:  Previous Diagnoses: ADHD  Previous Medication Trials: yes, Concerta- good effect per collateral  Vyvanse- negative effect of poor appetite and diminished mood  Previous Psychiatric Hospitalizations:no  Previous Suicide Attempts: no  History of Violence: no  Outpatient psychiatrist: no    Social History:  Born and raised: [facility]   Marital Status: not   Children: 0  Other significant relationships: New uncle " "to 6 month old niece   Employment Status/Info: Unemployed, student   Education: Some college  Special Ed: no  Legal stressors/past charges: none  Hobbies: Video games  Housing Status: with family  History of phys/sexual abuse: denies  Easy access to gun: no    Substance Abuse History:  Tobacco Use:yes, vaping  Use of Alcohol: occasional, social use  Recreational Drugs: Denied   Rehab History:no    Family Psychiatric History:  Maternal father ?alcohol use disorder     Objective:    Current Medications:  Current Outpatient Medications on File Prior to Visit   Medication Sig Dispense Refill    dextroamphetamine-amphetamine (ADDERALL XR) 15 MG 24 hr capsule Take 1 capsule (15 mg total) by mouth every morning. 30 capsule 0    [START ON 9/27/2019] dextroamphetamine-amphetamine (ADDERALL XR) 15 MG 24 hr capsule Take 1 capsule (15 mg total) by mouth every morning. 30 capsule 0     No current facility-administered medications on file prior to visit.        Allergies:  Patient has no known allergies.    Vital Signs:  Vitals:    08/30/19 0804   BP: (!) 143/76   Pulse: 60     Body mass index is 27.38 kg/m².    General Physical:  Motor: normal bulk and tone, grossly intact  Gait/Station: normal gait no deficits in station     Mental Status Exam:  Appearance/Behavior:  Normocephalic, atraumatic, appears stated age, fair grooming, engaged, appropriate eye contact, no abnormal involuntary movements   Speech: conversational rate, tone, volume, articulation  Language: english, fluent, without gross idiosyncrasies; at times profane   Mood and affect: "good," full affect  mood congruent, appropriate to situation  Thought Process: linear, organized, goal directed   Associations: intact  Thought Content/Perceptual disturbances: no reported suicidal or homicidal ideation and intent/ no reported auditory/visual hallucinations, no noted responding to internal stimuli  Sensorium/Orientation: AAOx3  Attention/Concentration: intact to " interview  Recent/Remote Memory: intact to childhood events, intact to recent medical appointments   Fund of Knowledge: consistent with education  Insight: Fair  Judgment: fair   ?    Laboratory Data:  Labs reviewed, including but not limited to:   Recent Labs   Lab 07/24/19  1206   WBC 3.83 L   Hemoglobin 14.0   Hematocrit 42.6   Mean Corpuscular Volume 88   Platelets 322   Sodium 143   Potassium 4.4   Chloride 106   Creatinine 1.2   BUN, Bld 11   AST 14   ALT 8 L   Albumin 4.0   TSH 0.686     Hemoglobin A1C   Date Value Ref Range Status   07/24/2019 5.5 4.0 - 5.6 % Final     Comment:     ADA Screening Guidelines:  5.7-6.4%  Consistent with prediabetes  >or=6.5%  Consistent with diabetes  High levels of fetal hemoglobin interfere with the HbA1C  assay. Heterozygous hemoglobin variants (HbS, HgC, etc)do  not significantly interfere with this assay.   However, presence of multiple variants may affect accuracy.             Imaging:  Pertinent imaging reviewed, including but not limited to:      Medicine section tests:  Vitamin D    ?  Tasia Estrada MD  PGY 3 [employer] [facility]  8/30/2019 9:36 AM

## 2020-08-14 DIAGNOSIS — Z11.59 NEED FOR HEPATITIS C SCREENING TEST: ICD-10-CM

## 2022-07-27 NOTE — TELEPHONE ENCOUNTER
----- Message from Yokasta Hawthorne sent at 7/17/2017 12:51 PM CDT -----  Contact: Mom Jaclyn  [phone]  Mom state she need a statement re: Pt condition and treatment for school.She ask that you give her a call because she need all the medication listed and what they are for.She states she will pick it up when ready.  
Done    
Letter is written and in your box for signature  
Yes - the patient is able to be screened